# Patient Record
Sex: FEMALE | Race: WHITE | NOT HISPANIC OR LATINO | URBAN - METROPOLITAN AREA
[De-identification: names, ages, dates, MRNs, and addresses within clinical notes are randomized per-mention and may not be internally consistent; named-entity substitution may affect disease eponyms.]

---

## 2022-06-04 ENCOUNTER — EMERGENCY (EMERGENCY)
Facility: HOSPITAL | Age: 67
LOS: 1 days | Discharge: HOME | End: 2022-06-04
Attending: EMERGENCY MEDICINE
Payer: MEDICARE

## 2022-06-04 VITALS
RESPIRATION RATE: 16 BRPM | OXYGEN SATURATION: 99 % | DIASTOLIC BLOOD PRESSURE: 70 MMHG | SYSTOLIC BLOOD PRESSURE: 118 MMHG | HEART RATE: 80 BPM | TEMPERATURE: 98 F

## 2022-06-04 PROCEDURE — 90715 TDAP VACCINE 7 YRS/> IM: CPT

## 2022-06-04 PROCEDURE — 99284 EMERGENCY DEPT VISIT MOD MDM: CPT

## 2022-06-04 PROCEDURE — 82962 GLUCOSE BLOOD TEST: CPT

## 2022-06-04 PROCEDURE — 99285 EMERGENCY DEPT VISIT HI MDM: CPT | Mod: 25

## 2022-06-04 RX ORDER — TETANUS TOXOID, REDUCED DIPHTHERIA TOXOID AND ACELLULAR PERTUSSIS VACCINE, ADSORBED 5; 2.5; 8; 8; 2.5 [IU]/.5ML; [IU]/.5ML; UG/.5ML; UG/.5ML; UG/.5ML
0.5 SUSPENSION INTRAMUSCULAR ONCE
Refills: 0 | Status: COMPLETED | OUTPATIENT
Start: 2022-06-04 | End: 2022-06-04

## 2022-06-04 RX ADMIN — TETANUS TOXOID, REDUCED DIPHTHERIA TOXOID AND ACELLULAR PERTUSSIS VACCINE, ADSORBED 0.5 MILLILITER(S): 5; 2.5; 8; 8; 2.5 SUSPENSION INTRAMUSCULAR at 18:43

## 2022-06-04 NOTE — ED PROVIDER NOTE - NS ED ROS FT
Constitutional: no fever, no chills, +fall   Head: NC, AT   Eyes: no redness   ENMT: no nasal congestion/drainage, no sore throat   CV: no chest pain, no edema  Resp: no cough, no dyspnea  GI: no abdominal pain, no nausea, no vomiting, no diarrhea  : no dysuria, no hematuria   Skin: +abrasion under R eye, no rashes   Neuro: no LOC, no headache, no sensory deficits, no weakness

## 2022-06-04 NOTE — ED ADULT TRIAGE NOTE - CHIEF COMPLAINT QUOTE
pt presents to ED s/p trip and fall in [place] prior to getting on plane to fly home to [place].  abrasion to forehead, denies blood thinners or LOC.  admits to drinking alcohol prior to getting on plane and during flight.  speech slurred.  MD called for eval in triage.  Patient undressed and placed in yellow gown, belongings secured. pt presents to ED s/p trip and fall in [place] prior to getting on plane to fly home to [place].  abrasion to forehead, denies blood thinners or LOC.  admits to drinking alcohol prior to getting on plane and during flight.  MD for eval in triage.

## 2022-06-04 NOTE — ED ADULT TRIAGE NOTE - NS ED TRIAGE AVPU SCALE
Evaluation Evaluation for rehabilitation Alert-The patient is alert, awake and responds to voice. The patient is oriented to time, place, and person. The triage nurse is able to obtain subjective information.

## 2022-06-04 NOTE — ED PROVIDER NOTE - OBJECTIVE STATEMENT
67 year old female who presents following a fall. The patient was in [place] and sustained a mechanical fall while in the airport in [place]. No LOC. She landed in the [place] airport and while getting off the airplane was noted to be unsteady on her feet. EMS called and pt brought to ED for further eval. Pt noted to have scratched the right side of her glasses and have a small abrasion under her right eye. Admits that she had multiple alcoholic drinks today and did not have anything to eat today. Denies headache, focal weakness, chest pain, shortness of breath, and abdominal pain. Not on anticoagulation. Medications: klonopin, lexapro

## 2022-06-04 NOTE — ED PROVIDER NOTE - CLINICAL SUMMARY MEDICAL DECISION MAKING FREE TEXT BOX
67 year old female who presents following a mechanical fall several hours ago in setting of alcohol intoxication. Son states this has happened multiple times previously. No focal deficits. Will give adacel and likely dc

## 2022-06-04 NOTE — ED PROVIDER NOTE - PROGRESS NOTE DETAILS
Mitul: Patient's son arrived. States that he is "not surprised" this happened today. Happens a few times a year.

## 2022-06-04 NOTE — ED PROVIDER NOTE - ATTENDING CONTRIBUTION TO CARE
I personally saw the patient with the resident, and completed the key components of the history and physical exam. I then discussed the management plan with the resident.    68 y/o F presents for a fall onto right face, + EtOH, no LOC, confused, but intoxicated. Per family, this happens from time to time. Denies HA, complaints.    PE - NAD, abrasion superior to right eyebrow, mild ecchymosis of right cheek, EOMI, midface stable, no intraoral lacerations, no midline spinal TTP, no neuro deficits, no respiratory distress, calm and cooperative, A&O x 4.    adacel, finger stick, family comfortable taking patient home.

## 2022-06-04 NOTE — ED PROVIDER NOTE - CARE PLAN
Principal Discharge DX:	Mild alcohol intoxication  Secondary Diagnosis:	Accidental fall  Secondary Diagnosis:	Minor abrasion   1

## 2022-06-04 NOTE — ED ADULT NURSE REASSESSMENT NOTE - NS ED NURSE REASSESS COMMENT FT1
Pt assessed, treated and d/c prior to RN assessment by Dr Bauman, pt medicated as per orders and d/c home w/ family member.

## 2022-06-04 NOTE — ED PROVIDER NOTE - PHYSICAL EXAMINATION
Gen: Well appearing in NAD  Head: NC/AT  Neck: trachea midline  Resp:  No distress  Ext: no deformities  Neuro:  A&O appears non focal, 5/5 strength in all extremities, normal FNF, no pronator drift, sensation intact all 4 extremities   Skin:  Warm and dry as visualized  Psych: Calm, cooperative

## 2022-06-04 NOTE — ED ADULT NURSE NOTE - CHIEF COMPLAINT QUOTE
pt presents to ED s/p trip and fall in [place] prior to getting on plane to fly home to [place].  abrasion to forehead, denies blood thinners or LOC.  admits to drinking alcohol prior to getting on plane and during flight.  MD for eval in triage.

## 2022-06-04 NOTE — ED PROVIDER NOTE - PATIENT PORTAL LINK FT
You can access the FollowMyHealth Patient Portal offered by [facility] by registering at the following website: [email or web address]. By joining [employer]’s FollowMyHealth portal, you will also be able to view your health information using other applications (apps) compatible with our system.

## 2022-06-04 NOTE — ED PROVIDER NOTE - NSFOLLOWUPINSTRUCTIONS_ED_ALL_ED_FT
-Drink alcohol in moderation  -Stay well hydrated   -Return if you have any new, worse, or concerning symptoms     SEEK IMMEDIATE MEDICAL CARE IF YOU HAVE ANY OF THE FOLLOWING SYMPTOMS: chest pain, shortness of breath, change in mental status, thoughts about hurting killing yourself, thoughts about hurting or killing somebody else, hallucinations, or worsening depression.